# Patient Record
Sex: FEMALE | Race: WHITE | URBAN - METROPOLITAN AREA
[De-identification: names, ages, dates, MRNs, and addresses within clinical notes are randomized per-mention and may not be internally consistent; named-entity substitution may affect disease eponyms.]

---

## 2018-08-20 ENCOUNTER — OFFICE VISIT (OUTPATIENT)
Dept: URBAN - METROPOLITAN AREA CLINIC 193 | Facility: CLINIC | Age: 78
End: 2018-08-20
Payer: MEDICARE

## 2018-08-20 DIAGNOSIS — H04.123 DRY EYE SYNDROME OF BILATERAL LACRIMAL GLANDS: ICD-10-CM

## 2018-08-20 DIAGNOSIS — H40.053 OCULAR HYPERTENSION, BILATERAL: Primary | ICD-10-CM

## 2018-08-20 DIAGNOSIS — H25.13 NUCLEAR SCLEROSIS CATARACT, BILATERAL: ICD-10-CM

## 2018-08-20 PROCEDURE — 92002 INTRM OPH EXAM NEW PATIENT: CPT | Performed by: OPTOMETRIST

## 2018-08-20 ASSESSMENT — INTRAOCULAR PRESSURE
OS: 19
OD: 25
OS: 28
OD: 32

## 2018-08-20 NOTE — IMPRESSION/PLAN
Impression: Ocular hypertension, bilateral: H40.053. OD>OS. Hx of glc suspicion. ?Shane 84. Roscoe Huntley 75 appearance reassuring. Plan: GLC: Discussed condition in detail. Further testing needed. Schedule OCT of optic nerve, Visual Field 24-2, and pachymetry next available.

## 2018-08-20 NOTE — IMPRESSION/PLAN
Impression: Dry eye syndrome of bilateral lacrimal glands: H04.123. eyes sting, FBS Plan: Continue gel ATs. Try spray tears. call if worsens.

## 2018-08-20 NOTE — IMPRESSION/PLAN
Impression: Nuclear sclerosis cataract, bilateral: H25.13.  Likely needs cataract surgery but undilated today Plan: Return for cataract evaluation with glc eval